# Patient Record
(demographics unavailable — no encounter records)

---

## 2024-12-04 NOTE — HISTORY OF PRESENT ILLNESS
[FreeTextEntry1] : Ms. DAMI WASHBURN is a 49-year-old female who presents for initial endocrine evaluation. Patient presents with regard to a history of Nodular Thyroid and Hypothyroidism. Denies hx of Hashimoto's.  Patient was previously following with endocrinologist, Dr. Manohar Perez, who unfortunately passed away in July, so she now presents for transfer of care. She presents via the kind courtesy of GYN, Dr. Leah Montelongo.   Patient reports that she was first diagnosed with Nodular Thyroid at around 23 years old. She states that the nodule gradually grew with several benign FNAs, but due to large size she eventually underwent total thyroidectomy in 2010 with Dr. Eddy. Per patient, pathology returned with benign findings. She subsequently developed hypothyroidism and was placed on Synthroid.    For the hypothyroidism, the patient is currently taking Synthroid now at 125mcg daily along with Cytomel 5mcg BiD.  She states that Cytomel was added around 2022 when she expressed concerns for weight gain. She does note jitters and palpitations (mostly when she is at a doctor's office and otherwise only when very upset), but unsure if related to the Cytomel or her anxiety.   She has been compliant in taking the LT4 and T3 daily, away from food or any medication that may inhibit absorption. She has tolerated this medication well without any apparent adverse effects. She denies any temperature intolerance, significant weight changes, or severe fatigue. She in addition denies any tremors, anxiousness, change in bowel habits or significant change in moods.  Patient does report trouble staying asleep nocturnally. She constantly wakes up but does fall back asleep.   Menses have been regular. Has 4 children with youngest at 16 years old and oldest at 26 years old.   Notes weight was about 150 after last pregnancy and she has gradually gained weight thereafter.  Patient has tried weight watchers and other weight loss efforts and would lose some weight but regain after. She notes that prior to having children, she never had difficulty losing weight.   Diet: tries to follow dietary discretion but will struggle at times. Does cook all her meals.  Activity: walks a lot and bikes. No routine exercise regimen.  She would be interested in using pharmacotherapy for facilitation of weight loss.   FHx: Sister has thyroid nodules, denies thyroid dysfunction   ____________________________________________________________________________________________________  Additional medical history includes that of anxiety (on and off meds for years).   Surgeries: umbilical hernia repair after pregnancy, D&C   Allergies: none   Additional Medications: Sertraline, NuvaRing  Supplements:   Social Hx: denies tobacco and alcohol use.    FHx: Mother - stent placement Father - HTN    GYN: Dr. Leah Montelnogo.

## 2024-12-04 NOTE — ADDENDUM
[FreeTextEntry1] : Blood will be drawn in office today.  This note was written by Leanna Au on 12/04/2024 acting as medical scribe for Dr. Bobby Souza. I, Dr. Bobby Souza, have read and attest that all the information, medical decision making and discharge instructions within are true and accurate.

## 2025-06-04 NOTE — ADDENDUM
[FreeTextEntry1] : Blood will be drawn in office today.  This note was written by Yanni Hernandez on 06/04/2025 acting as medical scribe for Dr. Bobby Souza. I, Dr. Bobby Souza, have read and attest that all the information, medical decision making and discharge instructions within are true and accurate.

## 2025-06-04 NOTE — HISTORY OF PRESENT ILLNESS
[FreeTextEntry1] : Ms. DAMI WASHBURN is a 49-year-old female who returns for follow-up endocrine reevaluation. Patient returns with regard to a history of Nodular Thyroid and Hypothyroidism. Denies hx of Hashimoto's.   Patient reports that she was first diagnosed with Nodular Thyroid at around 23 years old. She states that the nodule gradually grew with several benign FNAs, but due to large size she eventually underwent total thyroidectomy in 2010 with Dr. Eddy. Per patient, pathology returned with benign findings. She subsequently developed hypothyroidism and was placed on Synthroid.    For the hypothyroidism, the patient is currently taking Synthroid now at 125mcg daily. Off Cytomel. Latest TFTs stable wnl in January 2025 with TSH at 1.04, FT4 at 1.3 and FT3 at 2.6.  She has been compliant in taking the LT4, away from food or any medication that may inhibit absorption. She has tolerated this medication well without any apparent adverse effects. She denies any temperature intolerance, significant weight changes, or severe fatigue. She in addition denies any tremors, anxiousness, change in bowel habits or significant change in moods.  Patient does report trouble staying asleep nocturnally. She constantly wakes up but does fall back asleep.   Menses have been regular. Has 4 children with youngest at 16 years old and oldest at 26 years old.   Notes weight was about 150 lbs after last pregnancy and she has gradually gained weight thereafter.  Patient has tried weight watchers and other weight loss efforts and would lose some weight but regain after. She notes that prior to having children, she never had difficulty losing weight.   ***She is now taking Zepbound 10 mg/week, has been on current dos since May 2025. Notes mild nausea 2-4 days after injection. Denies constipation.  She reports of minimal weight loss while on Zepbound. She had lost the most weight when initially starting Zepbound and did not see much weight loss even while increasing dose. Nausea did not change even with changes in dose.  Wants to remain on current dos for 1 more month.  Too c/o food tasting bitter and metallic as well as hair thinning (not too bothered as she has thick hair) and bubbles in urine.  Diet: tries to follow dietary discretion but will struggle at times. Does cook all her meals.  Activity: walks a lot and bikes. No routine exercise regimen.  ____________________________________________________________________________________________________  Additional medical history includes that of anxiety (on and off meds for years).    Additional Medications: Sertraline, NuvaRing   Takes vitamin D3 and B12 intermittently.   GYN: Dr. Leah Montelongo.